# Patient Record
Sex: MALE | Race: WHITE | NOT HISPANIC OR LATINO | URBAN - METROPOLITAN AREA
[De-identification: names, ages, dates, MRNs, and addresses within clinical notes are randomized per-mention and may not be internally consistent; named-entity substitution may affect disease eponyms.]

---

## 2018-04-04 ENCOUNTER — IMPORTED ENCOUNTER (OUTPATIENT)
Dept: URBAN - METROPOLITAN AREA CLINIC 31 | Facility: CLINIC | Age: 73
End: 2018-04-04

## 2018-04-04 PROBLEM — H43.813: Noted: 2018-04-04

## 2018-04-04 PROBLEM — H25.13: Noted: 2018-04-04

## 2018-04-04 PROBLEM — H04.123: Noted: 2018-04-04

## 2018-04-04 PROCEDURE — 92014 COMPRE OPH EXAM EST PT 1/>: CPT

## 2018-04-04 NOTE — PATIENT DISCUSSION
1.  PVD OU:  Patient was cautioned to call our office immediately if they experience a substantial change in their symptoms such as an increase in floaters persistent flashes loss of visual field (may appear as a shadow or a curtain) or decrease in visual acuity as these may indicate a retinal tear or detachment. If this is a new problem patient will need to return for re-examination  as determined by the [address]. Nuclear Sclerotic Cataract OU: Explained how cataracts can effect vision. Recommend clinical observation. The patient was advised to contact us if any change or worsening of vision. 3. Dry Eyes OU:  Start artificials tears. Encouraged regular use. 4.  Return for an appointment in 1 year for comprehensive exam. with Dr. Dakota Colbert.

## 2019-04-05 ENCOUNTER — IMPORTED ENCOUNTER (OUTPATIENT)
Dept: URBAN - METROPOLITAN AREA CLINIC 31 | Facility: CLINIC | Age: 74
End: 2019-04-05

## 2019-04-05 PROBLEM — H43.813: Noted: 2019-04-05

## 2019-04-05 PROBLEM — H25.13: Noted: 2019-04-05

## 2019-04-05 PROBLEM — H04.123: Noted: 2019-04-05

## 2019-04-05 PROCEDURE — 92014 COMPRE OPH EXAM EST PT 1/>: CPT

## 2019-04-05 NOTE — PATIENT DISCUSSION
1.  PVD OU:  Patient was cautioned to call our office immediately if they experience a substantial change in their symptoms such as an increase in floaters persistent flashes loss of visual field (may appear as a shadow or a curtain) or decrease in visual acuity as these may indicate a retinal tear or detachment. If this is a new problem patient will need to return for re-examination  as determined by the [address]. Nuclear Sclerotic Cataract OU: monitor3. Dry Eyes OU:  Refresh Repair. Encouraged regular use. 4.  Return for an appointment in 1 year for comprehensive exam with Dr. Latasha Puente.

## 2022-01-06 ENCOUNTER — IMPORTED ENCOUNTER (OUTPATIENT)
Dept: URBAN - METROPOLITAN AREA CLINIC 31 | Facility: CLINIC | Age: 77
End: 2022-01-06

## 2022-01-06 PROBLEM — H25.13: Noted: 2022-01-06

## 2022-01-06 PROBLEM — H04.123: Noted: 2022-01-06

## 2022-01-06 PROBLEM — H43.813: Noted: 2022-01-06

## 2022-01-06 PROCEDURE — 92015 DETERMINE REFRACTIVE STATE: CPT

## 2022-01-06 PROCEDURE — 99214 OFFICE O/P EST MOD 30 MIN: CPT

## 2022-01-06 NOTE — PATIENT DISCUSSION
1.  PVD OU:   2.  Nuclear Sclerotic Cataract OU: monitor3. Dry Eyes OU:  Refresh Repair. Encouraged regular use. 4.  Refractive error - Can consider rx reading glasses but update distance rx. 5. Return for an appointment in 1 year for comprehensive exam with Dr. Abigail Bridges.

## 2022-04-02 ASSESSMENT — VISUAL ACUITY
OS_CC: 20/30
OS_SC: 20/50
OD_CC: 20/25
OD_CC: 20/30+2
OU_SC: 20/50+2
OD_SC: 20/50
OS_CC: 20/30
OD_CC: 20/25
OS_SC: J3
OU_CC: 20/30+2
OS_CC: 20/30
OU_SC: 20/40
OD_SC: J3

## 2022-04-02 ASSESSMENT — TONOMETRY
OD_IOP_MMHG: 17
OS_IOP_MMHG: 17
OD_IOP_MMHG: 17
OS_IOP_MMHG: 17
OD_IOP_MMHG: 14
OS_IOP_MMHG: 14

## 2023-12-14 ENCOUNTER — COMPREHENSIVE EXAM (OUTPATIENT)
Dept: URBAN - METROPOLITAN AREA CLINIC 29 | Facility: CLINIC | Age: 78
End: 2023-12-14

## 2023-12-14 DIAGNOSIS — H35.3131: ICD-10-CM

## 2023-12-14 DIAGNOSIS — H25.13: ICD-10-CM

## 2023-12-14 PROCEDURE — 99214 OFFICE O/P EST MOD 30 MIN: CPT

## 2023-12-14 ASSESSMENT — VISUAL ACUITY
OS_SC: 20/25
OD_SC: 20/25
OS_CC: 20/20-2
OD_CC: 20/20

## 2023-12-14 ASSESSMENT — TONOMETRY
OS_IOP_MMHG: 17
OD_IOP_MMHG: 17

## 2024-01-11 ENCOUNTER — FOLLOW UP (OUTPATIENT)
Dept: URBAN - METROPOLITAN AREA CLINIC 29 | Facility: CLINIC | Age: 79
End: 2024-01-11

## 2024-01-11 DIAGNOSIS — H57.813: ICD-10-CM

## 2024-01-11 DIAGNOSIS — H35.3131: ICD-10-CM

## 2024-01-11 PROCEDURE — 99214 OFFICE O/P EST MOD 30 MIN: CPT

## 2024-01-11 PROCEDURE — 92083 EXTENDED VISUAL FIELD XM: CPT

## 2024-01-11 ASSESSMENT — VISUAL ACUITY
OS_SC: 20/20
OD_SC: 20/20-2

## 2024-02-07 ENCOUNTER — SURGERY/PROCEDURE (OUTPATIENT)
Dept: URBAN - METROPOLITAN AREA SURGERY 17 | Facility: SURGERY | Age: 79
End: 2024-02-07

## 2024-02-07 DIAGNOSIS — H57.813: ICD-10-CM

## 2024-02-07 PROCEDURE — 67900 REPAIR BROW DEFECT: CPT

## 2024-02-13 ENCOUNTER — POST-OP (OUTPATIENT)
Dept: URBAN - METROPOLITAN AREA CLINIC 29 | Facility: CLINIC | Age: 79
End: 2024-02-13

## 2024-02-13 DIAGNOSIS — Z98.890: ICD-10-CM

## 2024-02-13 PROCEDURE — 66999PO NON CO-MANAGED OTHER SURGERY PO

## 2024-02-13 ASSESSMENT — VISUAL ACUITY
OS_SC: 20/25
OD_SC: 20/20

## 2024-12-13 ENCOUNTER — COMPREHENSIVE EXAM (OUTPATIENT)
Age: 79
End: 2024-12-13

## 2024-12-13 DIAGNOSIS — H35.3131: ICD-10-CM

## 2024-12-13 DIAGNOSIS — H25.813: ICD-10-CM

## 2024-12-13 DIAGNOSIS — H04.123: ICD-10-CM

## 2024-12-13 DIAGNOSIS — H43.813: ICD-10-CM

## 2024-12-13 DIAGNOSIS — Z98.890: ICD-10-CM

## 2024-12-13 PROCEDURE — 92014 COMPRE OPH EXAM EST PT 1/>: CPT
